# Patient Record
Sex: MALE | Race: WHITE | NOT HISPANIC OR LATINO | Employment: FULL TIME | URBAN - METROPOLITAN AREA
[De-identification: names, ages, dates, MRNs, and addresses within clinical notes are randomized per-mention and may not be internally consistent; named-entity substitution may affect disease eponyms.]

---

## 2019-09-03 ENCOUNTER — OFFICE VISIT (OUTPATIENT)
Dept: ORTHOPEDIC SURGERY | Facility: CLINIC | Age: 59
End: 2019-09-03

## 2019-09-03 VITALS
WEIGHT: 250 LBS | HEIGHT: 72 IN | HEART RATE: 84 BPM | DIASTOLIC BLOOD PRESSURE: 91 MMHG | BODY MASS INDEX: 33.86 KG/M2 | SYSTOLIC BLOOD PRESSURE: 153 MMHG

## 2019-09-03 DIAGNOSIS — R52 PAIN: Primary | ICD-10-CM

## 2019-09-03 DIAGNOSIS — M17.0 PRIMARY OSTEOARTHRITIS OF BOTH KNEES: ICD-10-CM

## 2019-09-03 PROCEDURE — 99203 OFFICE O/P NEW LOW 30 MIN: CPT | Performed by: ORTHOPAEDIC SURGERY

## 2019-09-03 PROCEDURE — 73562 X-RAY EXAM OF KNEE 3: CPT | Performed by: ORTHOPAEDIC SURGERY

## 2019-09-03 PROCEDURE — 20610 DRAIN/INJ JOINT/BURSA W/O US: CPT | Performed by: ORTHOPAEDIC SURGERY

## 2019-09-03 RX ORDER — LORATADINE 10 MG/1
CAPSULE, LIQUID FILLED ORAL
COMMUNITY

## 2019-09-03 RX ADMIN — LIDOCAINE HYDROCHLORIDE 8 ML: 10 INJECTION, SOLUTION EPIDURAL; INFILTRATION; INTRACAUDAL; PERINEURAL at 16:16

## 2019-09-03 RX ADMIN — TRIAMCINOLONE ACETONIDE 80 MG: 40 INJECTION, SUSPENSION INTRA-ARTICULAR; INTRAMUSCULAR at 16:16

## 2019-09-03 NOTE — PROGRESS NOTES
Subjective:     Patient ID: Glenn Hunt is a 58 y.o. male.    Chief Complaint: bilateral knee pain, new patient to provider    History of Present Illness  Glenn Hunt presents to clinic today for evaluation of his bilateral knees, left worse than right.  Today, he rates the pain as 7-8/10, describes it as aching in nature.  Localizes pain to medially and anteriorly.  Has noted improvement with rest and previous cortisone injections.  Symptoms are exacerbated with getting up from a seated position and going up and down stairs. Denies radiation of pain, denies associated numbness or tingling.     Social History     Occupational History   • Not on file   Tobacco Use   • Smoking status: Former Smoker   Substance and Sexual Activity   • Alcohol use: Yes   • Drug use: Not on file   • Sexual activity: Not on file      Past Medical History:   Diagnosis Date   • Tear of meniscus of knee      Past Surgical History:   Procedure Laterality Date   • KNEE SURGERY         Family History   Problem Relation Age of Onset   • Clotting disorder Mother          Review of Systems   Constitutional: Negative for chills, diaphoresis, fever and unexpected weight change.   HENT: Negative for hearing loss, nosebleeds, sore throat and tinnitus.    Eyes: Negative for pain and visual disturbance.   Respiratory: Negative for cough, shortness of breath and wheezing.    Cardiovascular: Negative for chest pain and palpitations.   Gastrointestinal: Negative for abdominal pain, diarrhea, nausea and vomiting.   Endocrine: Negative for cold intolerance, heat intolerance and polydipsia.   Genitourinary: Negative for difficulty urinating, dysuria and hematuria.   Musculoskeletal: Positive for arthralgias and joint swelling. Negative for myalgias.   Skin: Negative for rash and wound.   Allergic/Immunologic: Negative for environmental allergies.   Neurological: Negative for dizziness, syncope and numbness.   Hematological: Does not bruise/bleed easily.  "  Psychiatric/Behavioral: Negative for dysphoric mood and sleep disturbance. The patient is not nervous/anxious.            Objective:  Vitals:    09/03/19 1528   BP: 153/91   Pulse: 84   Weight: 113 kg (250 lb)   Height: 182.9 cm (72\")         09/03/19  1528   Weight: 113 kg (250 lb)     Body mass index is 33.91 kg/m².    Physical Exam    Vital signs reviewed.   General: No acute distress, alert and oriented  Eyes: conjunctiva clear; pupils equally round and reactive  ENT: external ears and nose atraumatic; oropharynx clear  CV: no peripheral edema  Resp: normal respiratory effort  Skin: no rashes or wounds; normal turgor  Psych: mood and affect appropriate; recent and remote memory intact      Ortho Exam     Right knee-  ROM 2-115  Maximal tenderness medial joint line and patella  Positive Lavinia exam  Positive APC  Stable to varus and valgus stress at 0 and 30 degrees  Negative log roll and stinchfield  Moderate effusion  Positive sensation light tough all distributions symmetric to contralateral side  Brisk cap refill all digits  2+ dorsalis pedis pulse      Left Knee-  ROM 3-100 degrees  Stable to varus and valgus stress at 0 and 30 degrees  Moderate effusion  Positive APC  Positive Lavinia exam  Negative log roll and stinchfield  Positive sensation light tough all distributions symmetric to contralateral side  Brisk cap refill all digits  2+ dorsalis pedis pulse    Imaging:    Bilateral Knee X-Ray  Indication: Pain    AP, Lateral, and [place] views    Findings:  No fracture  No bony lesion  Normal soft tissues  Advanced tricompartmental osteoarthritis with osteophytes and medial compartment joint space narrowing    No prior studies were available for comparison.    Assessment:        1. Pain    2. Primary osteoarthritis of both knees           Plan:  Large Joint Arthrocentesis  Date/Time: 9/3/2019 4:16 PM  Consent given by: patient  Site marked: site marked  Timeout: Immediately prior to procedure a time " out was called to verify the correct patient, procedure, equipment, support staff and site/side marked as required   Supporting Documentation  Indications: pain   Procedure Details  Location: knee - Knee joint: bilateral.  Preparation: Patient was prepped and draped in the usual sterile fashion  Needle size: 22 G  Approach: superior  Medications administered: 8 mL lidocaine PF 1% 1 %; 80 mg triamcinolone acetonide 40 MG/ML  Patient tolerance: patient tolerated the procedure well with no immediate complications                1. Discussed treatment options at length with patient at today's visit.   2. Patient would like to proceed with cortisone injections today to the bilateral knees. Recommended limited use of affected extremity for the next 24 hours to only essential activites other than work on general active and passive motion. Recommended supplementing with ice and soft tissue massage. Discussed with patient that they should see results in 5-7 days, if no improvement in 5-6 weeks I have asked them to call the office to review other options. Patient should call office immediately if they notice redness, warmth, fevers, chills, or residual numbness or tingling for greater than 6 hours after injection.       Glenn Hunt and his wife were in agreement with plan and had all questions answered.     Orders:  Orders Placed This Encounter   Procedures   • Large Joint Arthrocentesis   • XR Knee 3 View Bilateral       Medications:  No orders of the defined types were placed in this encounter.      Followup:  Return in about 6 weeks (around 10/15/2019).    Glenn was seen today for pain and pain.    Diagnoses and all orders for this visit:    Pain  -     XR Knee 3 View Bilateral    Primary osteoarthritis of both knees    Other orders  -     Large Joint Arthrocentesis        By signing my name here, I Donna Hallman, attest that all documentation on 09/18/19 at 10:47 AM has been prepared under the direction and in the  presence of Dr. Blas Keiht.    I, Dr. Blas Keith, personally performed the services described in this documentation, as scribed by Donna Hallman, in my presence, and it is both accurate and complete.      Dictated utilizing Dragon dictation

## 2019-09-18 ENCOUNTER — TELEPHONE (OUTPATIENT)
Dept: ORTHOPEDIC SURGERY | Facility: CLINIC | Age: 59
End: 2019-09-18

## 2019-09-18 RX ORDER — LIDOCAINE HYDROCHLORIDE 10 MG/ML
8 INJECTION, SOLUTION EPIDURAL; INFILTRATION; INTRACAUDAL; PERINEURAL
Status: COMPLETED | OUTPATIENT
Start: 2019-09-03 | End: 2019-09-03

## 2019-09-18 RX ORDER — TRIAMCINOLONE ACETONIDE 40 MG/ML
80 INJECTION, SUSPENSION INTRA-ARTICULAR; INTRAMUSCULAR
Status: COMPLETED | OUTPATIENT
Start: 2019-09-03 | End: 2019-09-03

## 2019-09-18 NOTE — TELEPHONE ENCOUNTER
Patient called back and stated that his knees felt better for about 4-5 days after the cortisone injection and now they are right back to hurting again. He inquired about having bilateral TKAs. Told patient we do not do them at the same time, however they might be able to be done 6-8 wks apart. He wanted to know what the next step is?

## 2019-10-29 ENCOUNTER — OFFICE VISIT (OUTPATIENT)
Dept: ORTHOPEDIC SURGERY | Facility: CLINIC | Age: 59
End: 2019-10-29

## 2019-10-29 VITALS — BODY MASS INDEX: 33.86 KG/M2 | WEIGHT: 250 LBS | HEIGHT: 72 IN

## 2019-10-29 DIAGNOSIS — M17.0 PRIMARY OSTEOARTHRITIS OF BOTH KNEES: Primary | ICD-10-CM

## 2019-10-29 PROCEDURE — 99213 OFFICE O/P EST LOW 20 MIN: CPT | Performed by: ORTHOPAEDIC SURGERY

## 2019-10-29 RX ORDER — NAPROXEN SODIUM 220 MG
220 TABLET ORAL 2 TIMES DAILY PRN
COMMUNITY
End: 2020-04-06

## 2019-10-29 RX ORDER — MELOXICAM 15 MG/1
15 TABLET ORAL DAILY
Qty: 30 TABLET | Refills: 0 | Status: SHIPPED | OUTPATIENT
Start: 2019-10-29 | End: 2020-02-10 | Stop reason: SDUPTHER

## 2019-10-29 NOTE — PROGRESS NOTES
Subjective:     Patient ID: Glenn Hunt is a 58 y.o. male.    Chief Complaint: follow-up bilateral knee pain    History of Present Illness  Glenn Hunt returns to clinic today for evaluation of his bilateral knees.  The patient had prior cortisone injections to the bilateral knees on 9/3/19. He notes approximately 50% improvement of the pain with the injection, lasting 2-3 days.  Today, he rates the pain as 6-7/10, describes it as aching in nature.    Localizes pain to the medial aspect of his knees.    Has mild improvement with Aleve, activity modification and rest.    Symptoms are exacerbated with getting up from a seated position, prolonged weightbearing and going up and down stairs.   Denies radiation of pain, denies associated numbness or tingling.      Social History     Occupational History   • Not on file   Tobacco Use   • Smoking status: Former Smoker   • Smokeless tobacco: Never Used   Substance and Sexual Activity   • Alcohol use: Yes   • Drug use: No   • Sexual activity: Defer      Past Medical History:   Diagnosis Date   • Tear of meniscus of knee      Past Surgical History:   Procedure Laterality Date   • KNEE SURGERY         Family History   Problem Relation Age of Onset   • Clotting disorder Mother          Review of Systems   Constitutional: Negative for chills, diaphoresis, fever and unexpected weight change.   HENT: Negative for hearing loss, nosebleeds, sore throat and tinnitus.    Eyes: Negative for pain and visual disturbance.   Respiratory: Negative for cough, shortness of breath and wheezing.    Cardiovascular: Negative for chest pain and palpitations.   Gastrointestinal: Negative for abdominal pain, diarrhea, nausea and vomiting.   Endocrine: Negative for cold intolerance, heat intolerance and polydipsia.   Genitourinary: Negative for difficulty urinating, dysuria and hematuria.   Musculoskeletal: Positive for arthralgias and myalgias. Negative for joint swelling.   Skin: Negative for  "rash and wound.   Allergic/Immunologic: Negative for environmental allergies.   Neurological: Negative for dizziness, syncope and numbness.   Hematological: Does not bruise/bleed easily.   Psychiatric/Behavioral: Negative for dysphoric mood and sleep disturbance. The patient is not nervous/anxious.            Objective:  Vitals:    10/29/19 0902   Weight: 113 kg (250 lb)   Height: 182.9 cm (72\")         10/29/19  0902   Weight: 113 kg (250 lb)     Body mass index is 33.91 kg/m².    General: No acute distress.  Resp: normal respiratory effort  Skin: no rashes or wounds; normal turgor  Psych: mood and affect appropriate; recent and remote memory intact      Ortho Exam       Left Knee-    ROM 3-95 degrees  Maximal tenderness medial joint line   No opening on varus and valgus stress at 5 and 30  4/5 on flexion  4/5 on extension  Log roll-  negative  Stinchfield-  negative    Positive sensation light tough all distributions symmetric to contralateral side  Brisk cap refill all digits  2+ dorsalis pedis pulse    Right Knee-    ROM 5-105 degrees  4/5 on flexion  4/5 on extension  Maximal tenderness medial joint line   No opening on varus and valgus stress at 5 and 30   Log roll-  negative  Stinchfield-  negative    Positive sensation light tough all distributions symmetric to contralateral side  Brisk cap refill all digits  2+ dorsalis pedis pulse    Imaging:  None     Assessment:        1. Primary osteoarthritis of both knees           Plan:          1. Discussed treatment options at length with patient at today's visit.   2. Prescribed Meloxicam today for relief of inflammatory pain in his bilateral knees.  3. We will get approval for gel injections for his bilateral knees.  Follow-up to start injection series once approved      Glenn Hunt and his family member were in agreement with plan and had all questions answered.     Orders:  Orders Placed This Encounter   Procedures   • Visco Treatment       Medications:  New " Medications Ordered This Visit   Medications   • meloxicam (MOBIC) 15 MG tablet     Sig: Take 1 tablet by mouth Daily.     Dispense:  30 tablet     Refill:  0       Followup:  Return for viscosupplement injections.    Glenn was seen today for pain, follow-up, edema, pain, follow-up and edema.    Diagnoses and all orders for this visit:    Primary osteoarthritis of both knees  -     Visco Treatment; Future    Other orders  -     meloxicam (MOBIC) 15 MG tablet; Take 1 tablet by mouth Daily.        By signing my name here, I Donna Hallman, attest that all documentation on 10/29/19 at 1:44 PM has been prepared under the direction and in the presence of Dr. Blas Keith.    I, Dr. Blas Keith, personally performed the services described in this documentation, as scribed by Donna Hallman, in my presence, and it is both accurate and complete.    Dictated utilizing Dragon dictation

## 2020-01-07 ENCOUNTER — TELEPHONE (OUTPATIENT)
Dept: ORTHOPEDIC SURGERY | Facility: CLINIC | Age: 60
End: 2020-01-07

## 2020-01-07 NOTE — TELEPHONE ENCOUNTER
Pt called and let me know he has new ins. It is [employer] ID# [id number] Grp# [id number] RxBin: [id number] PCN: [id number]  Pharmacy # [phone] he will present the card when he is in for an office visit but wanted us to have his new ins plan and number.

## 2020-01-29 ENCOUNTER — CLINICAL SUPPORT (OUTPATIENT)
Dept: ORTHOPEDIC SURGERY | Facility: CLINIC | Age: 60
End: 2020-01-29

## 2020-01-29 ENCOUNTER — TELEPHONE (OUTPATIENT)
Dept: ORTHOPEDIC SURGERY | Facility: CLINIC | Age: 60
End: 2020-01-29

## 2020-01-29 DIAGNOSIS — M17.0 PRIMARY OSTEOARTHRITIS OF BOTH KNEES: Primary | ICD-10-CM

## 2020-01-29 PROCEDURE — 20610 DRAIN/INJ JOINT/BURSA W/O US: CPT | Performed by: ORTHOPAEDIC SURGERY

## 2020-01-29 NOTE — PROGRESS NOTES
Large Joint Arthrocentesis  Date/Time: 1/29/2020 9:12 AM  Consent given by: patient  Site marked: site marked  Timeout: Immediately prior to procedure a time out was called to verify the correct patient, procedure, equipment, support staff and site/side marked as required   Supporting Documentation  Indications: pain   Procedure Details  Location: knee - Knee joint: bilateral.  Preparation: Patient was prepped and draped in the usual sterile fashion  Needle size: 22 G  Approach: superior  Medications administered: 88 mg Hyaluronan 88 MG/4ML  Patient tolerance: patient tolerated the procedure well with no immediate complications        Patient presents to clinic today for bilateral knee viscosupplement injections.  This is a one-shot Visco injection.  I explained details of injections as well as risks, benefits and alternatives with the patient today, had all questions answered, wished to proceed with injections.  I will see patient back as needed.  Patient was instructed to watch for signs or symptoms of infection including redness, swelling, warmth to the touch, or significant increased pain and to contact our office immediately if any of these issues were noted.

## 2020-01-29 NOTE — TELEPHONE ENCOUNTER
Giving patient his Euflexxa injections. They were shipped to us by [facility] when he had [employer] ins. He wanted to wait until the beginning of this year to get them. He consented these injections be shipped to us for over $2500 dollars. Patient's insurance changed at the beginning of the year and he was able to get Monovisc through his [employer] insurance. Pt has his Eufexxa injs in hand to take home.

## 2020-02-10 RX ORDER — MELOXICAM 15 MG/1
15 TABLET ORAL DAILY
Qty: 30 TABLET | Refills: 0 | Status: SHIPPED | OUTPATIENT
Start: 2020-02-10 | End: 2020-04-06

## 2020-03-03 ENCOUNTER — TELEPHONE (OUTPATIENT)
Dept: ORTHOPEDIC SURGERY | Facility: CLINIC | Age: 60
End: 2020-03-03

## 2020-03-03 RX ORDER — DICLOFENAC SODIUM 75 MG/1
75 TABLET, DELAYED RELEASE ORAL 2 TIMES DAILY
Qty: 42 TABLET | Refills: 0 | Status: SHIPPED | OUTPATIENT
Start: 2020-03-03 | End: 2020-04-06 | Stop reason: SDUPTHER

## 2020-03-03 NOTE — TELEPHONE ENCOUNTER
Mr. Hunt had bilateral visco injection on 1/29/20.  Right knee is doing well, left knee is not.  He is having pain, swelling and catching in the knee.  He is taking the meloxicam everyday.  Asking if something else can be done or if he could try another med.

## 2020-03-13 ENCOUNTER — TELEPHONE (OUTPATIENT)
Dept: ORTHOPEDIC SURGERY | Facility: CLINIC | Age: 60
End: 2020-03-13

## 2020-03-13 NOTE — TELEPHONE ENCOUNTER
Patient called and is interested in L knee surgery at this time. Do you want to see him back to discuss first, or just order surgery?

## 2020-03-16 ENCOUNTER — TELEPHONE (OUTPATIENT)
Dept: ORTHOPEDIC SURGERY | Facility: CLINIC | Age: 60
End: 2020-03-16

## 2020-03-16 NOTE — TELEPHONE ENCOUNTER
Patient called about wanting to schedule surgery for his knee. Explained to him that there is a stop on elective surgeries right now, so I could not give him a date. He asked is there is something he can take at night for the pain, because he has been unable to sleep and the injections and diclofenac are not helping.

## 2020-03-18 NOTE — TELEPHONE ENCOUNTER
Patient called back today regarding getting something for pain.  I told him that since our office had not done surgery on him, unfortunately we would not be able to prescribe pain med.  I advised him to call his PCP.

## 2020-03-19 RX ORDER — PREDNISONE 10 MG/1
TABLET ORAL
Qty: 40 TABLET | Refills: 0 | Status: SHIPPED | OUTPATIENT
Start: 2020-03-19 | End: 2020-04-06

## 2020-04-06 ENCOUNTER — TELEPHONE (OUTPATIENT)
Dept: ORTHOPEDIC SURGERY | Facility: CLINIC | Age: 60
End: 2020-04-06

## 2020-04-06 RX ORDER — DICLOFENAC SODIUM 75 MG/1
75 TABLET, DELAYED RELEASE ORAL 2 TIMES DAILY
Qty: 42 TABLET | Refills: 0 | Status: SHIPPED | OUTPATIENT
Start: 2020-04-06 | End: 2020-05-05 | Stop reason: SDUPTHER

## 2020-05-04 ENCOUNTER — TELEPHONE (OUTPATIENT)
Dept: ORTHOPEDIC SURGERY | Facility: CLINIC | Age: 60
End: 2020-05-04

## 2020-05-04 ENCOUNTER — TELEPHONE (OUTPATIENT)
Dept: GENERAL RADIOLOGY | Facility: HOSPITAL | Age: 60
End: 2020-05-04

## 2020-05-04 DIAGNOSIS — Z86.718 HISTORY OF DVT (DEEP VEIN THROMBOSIS): Primary | ICD-10-CM

## 2020-05-04 NOTE — TELEPHONE ENCOUNTER
----- Message from Chuck Day MD sent at 5/4/2020  3:42 PM EDT -----  Regarding: RE: new pt  Ok to sched next available or can video if he wants but he will prob have to come in for labs at some point  ----- Message -----  From: Alexandra Yun  Sent: 5/4/2020   3:06 PM EDT  To: Chuck Day MD  Subject: new pt                                           DR Blas Keith is referring pt for history of DVT but there have not been any labs done in 8 years. They have scanned the only records they have into the chart. Please let me know how soon pt should be scheduled or if you need something else in order to schedule? Thanks

## 2020-05-04 NOTE — TELEPHONE ENCOUNTER
Had a discussion with patient over the phone again today.  He related his significant history of multiple prior DVTs as well as history of prior pulmonary embolus with significant family history for his mother dying from a pulmonary embolus per his report.  He is interested in proceeding with total knee arthroplasty.  We need to get him evaluated with hematology first, I will see him in the office in the next 1 to 2 weeks for evaluation and for discussion of surgery to decide if he does want to proceed at that point.  He was in agreement had all questions answered.

## 2020-05-05 RX ORDER — DICLOFENAC SODIUM 75 MG/1
75 TABLET, DELAYED RELEASE ORAL 2 TIMES DAILY
Qty: 42 TABLET | Refills: 0 | Status: SHIPPED | OUTPATIENT
Start: 2020-05-05

## 2020-06-25 ENCOUNTER — APPOINTMENT (OUTPATIENT)
Dept: LAB | Facility: HOSPITAL | Age: 60
End: 2020-06-25